# Patient Record
Sex: FEMALE | Race: BLACK OR AFRICAN AMERICAN | NOT HISPANIC OR LATINO | URBAN - NONMETROPOLITAN AREA
[De-identification: names, ages, dates, MRNs, and addresses within clinical notes are randomized per-mention and may not be internally consistent; named-entity substitution may affect disease eponyms.]

---

## 2020-03-12 ENCOUNTER — IMPORTED ENCOUNTER (OUTPATIENT)
Dept: URBAN - NONMETROPOLITAN AREA CLINIC 1 | Facility: CLINIC | Age: 75
End: 2020-03-12

## 2020-03-12 PROBLEM — H25.813: Noted: 2020-03-12

## 2020-03-12 PROBLEM — H52.223: Noted: 2020-03-12

## 2020-03-12 PROBLEM — H52.4: Noted: 2020-03-12

## 2020-03-12 PROCEDURE — S0620 ROUTINE OPHTHALMOLOGICAL EXA: HCPCS

## 2020-03-12 NOTE — PATIENT DISCUSSION
Presbyopia-  Discussed refractive status w/ pt today-  MR done new GLRx given-  Monitor yearly or PRN Cataracts OU-  Discussed findings w/ pt today-  Sign/symptoms associated with progression discussed-  Patient defers any surgical intervention at this time-  Monitor yearly or PRN changes-  BAT next visitMild Blepharitis-  Discussed findings w/ pt today-  Recommended baby shampoo eyelid scrubs PRN-  Monitor PRN; 's Notes: MR  3/12/20DFE  3/12/20

## 2022-04-09 ASSESSMENT — VISUAL ACUITY
OS_CC: 20/25-
OD_GLARE: 20/40
OS_GLARE: 20/40
OD_CC: 20/30-2

## 2022-04-09 ASSESSMENT — TONOMETRY
OD_IOP_MMHG: 16
OS_IOP_MMHG: 15